# Patient Record
Sex: FEMALE | Race: WHITE | NOT HISPANIC OR LATINO | Employment: FULL TIME | URBAN - METROPOLITAN AREA
[De-identification: names, ages, dates, MRNs, and addresses within clinical notes are randomized per-mention and may not be internally consistent; named-entity substitution may affect disease eponyms.]

---

## 2023-09-26 DIAGNOSIS — F32.A ANXIETY AND DEPRESSION: ICD-10-CM

## 2023-09-26 DIAGNOSIS — F41.9 ANXIETY AND DEPRESSION: ICD-10-CM

## 2023-09-26 PROBLEM — R00.0 TACHYCARDIA: Status: ACTIVE | Noted: 2023-09-26

## 2023-09-26 RX ORDER — SERTRALINE HYDROCHLORIDE 25 MG/1
1 TABLET, FILM COATED ORAL DAILY
COMMUNITY
Start: 2022-09-07 | End: 2023-09-26 | Stop reason: SDUPTHER

## 2023-09-26 RX ORDER — SERTRALINE HYDROCHLORIDE 25 MG/1
25 TABLET, FILM COATED ORAL DAILY
Qty: 90 TABLET | Refills: 0 | Status: SHIPPED | OUTPATIENT
Start: 2023-09-26 | End: 2023-10-25 | Stop reason: SDUPTHER

## 2023-10-04 NOTE — TELEPHONE ENCOUNTER
Kemal Jolley 9/27/2023 10:24 AM EDT  ------------------------------------  Lvmtcb. 1 attempt.  Called pt; she stated she was driving and is going to cb when she gets to destination. 10/02/23.

## 2023-10-25 ENCOUNTER — OFFICE VISIT (OUTPATIENT)
Dept: PRIMARY CARE | Facility: CLINIC | Age: 33
End: 2023-10-25
Payer: COMMERCIAL

## 2023-10-25 VITALS
RESPIRATION RATE: 14 BRPM | DIASTOLIC BLOOD PRESSURE: 76 MMHG | SYSTOLIC BLOOD PRESSURE: 106 MMHG | WEIGHT: 147 LBS | HEART RATE: 100 BPM | TEMPERATURE: 97.9 F | BODY MASS INDEX: 28.86 KG/M2 | HEIGHT: 60 IN

## 2023-10-25 DIAGNOSIS — F32.A ANXIETY AND DEPRESSION: ICD-10-CM

## 2023-10-25 DIAGNOSIS — R00.0 TACHYCARDIA: Primary | ICD-10-CM

## 2023-10-25 DIAGNOSIS — F41.9 ANXIETY AND DEPRESSION: ICD-10-CM

## 2023-10-25 PROBLEM — F17.210 CIGARETTE NICOTINE DEPENDENCE WITHOUT COMPLICATION: Status: ACTIVE | Noted: 2023-10-25

## 2023-10-25 PROCEDURE — 4004F PT TOBACCO SCREEN RCVD TLK: CPT | Performed by: FAMILY MEDICINE

## 2023-10-25 PROCEDURE — 99214 OFFICE O/P EST MOD 30 MIN: CPT | Performed by: FAMILY MEDICINE

## 2023-10-25 RX ORDER — SERTRALINE HYDROCHLORIDE 25 MG/1
25 TABLET, FILM COATED ORAL DAILY
Qty: 90 TABLET | Refills: 3 | Status: SHIPPED | OUTPATIENT
Start: 2023-10-25

## 2023-10-25 NOTE — PROGRESS NOTES
Subjective   Letty Michelle is a 33 y.o. female who presents for ER Follow-up.  HPI  She was in the ER at [facility] on 10/17/23 for dizziness and near syncope. She had one more episode of dizziness on 10/19/23 but it was mild.  Visit Vitals  /76   Pulse 100   Temp 36.6 °C (97.9 °F)   Resp 14      Objective   Physical Exam  Constitutional:       Appearance: Normal appearance.   HENT:      Head: Normocephalic.   Eyes:      Conjunctiva/sclera: Conjunctivae normal.   Cardiovascular:      Rate and Rhythm: Normal rate and regular rhythm.   Pulmonary:      Effort: Pulmonary effort is normal.      Breath sounds: Normal breath sounds.   Musculoskeletal:      Cervical back: Neck supple.   Skin:     General: Skin is warm and dry.   Neurological:      Mental Status: She is alert.         Assessment/Plan    This 33-year-old female had a bout of loose diarrhea and had a near syncopal spell which caused her to go to the emergency room.  Thankfully blood work including CBC CMP troponins magnesium was all essentially normal.  EKG showed normal sinus tachycardia.  It was determined that this was due to diarrhea induced dehydration on top of some moderate chronic tachycardia and anxiety.  She has been better since and has been working on hydration and the diarrhea has resolved  Problem List Items Addressed This Visit       Anxiety and depression    Relevant Medications    sertraline (Zoloft) 25 mg tablet    Other Relevant Orders    Follow Up In [facility] - PCP - Established    Tachycardia - Primary     This is better today with a systolic 100 but it was higher in the emergency room when she was stressed.  I reviewed the EKG, troponin, and metabolic panel from the emergency room which were all normal except for sinus tachycardia.  I continue to believe this is overstimulation due to a significant amount of nicotine and caffeine in her diet along with stress.  I strongly advised her to cut down or quit smoking and keep her  caffeine intake to a minimum.  She will continue taking the sertraline 25 mg daily.

## 2023-10-25 NOTE — ASSESSMENT & PLAN NOTE
This is better today with a systolic 100 but it was higher in the emergency room when she was stressed.  I reviewed the EKG, troponin, and metabolic panel from the emergency room which were all normal except for sinus tachycardia.  I continue to believe this is overstimulation due to a significant amount of nicotine and caffeine in her diet along with stress.  I strongly advised her to cut down or quit smoking and keep her caffeine intake to a minimum.  She will continue taking the sertraline 25 mg daily.

## 2024-10-25 ENCOUNTER — APPOINTMENT (OUTPATIENT)
Dept: PRIMARY CARE | Facility: CLINIC | Age: 34
End: 2024-10-25
Payer: COMMERCIAL